# Patient Record
Sex: FEMALE | Race: WHITE | URBAN - METROPOLITAN AREA
[De-identification: names, ages, dates, MRNs, and addresses within clinical notes are randomized per-mention and may not be internally consistent; named-entity substitution may affect disease eponyms.]

---

## 2018-05-10 ENCOUNTER — VIRTUAL VISIT (OUTPATIENT)
Dept: FAMILY MEDICINE | Facility: OTHER | Age: 23
End: 2018-05-10

## 2018-05-10 NOTE — PROGRESS NOTES
"Date:   Clinician: Bee Mcmillan  Clinician NPI: [id number]  Patient: Lena Shen  Patient : 1995  Patient Address: [address], [place] [address]  Patient Phone: [phone]  Visit Protocol: URI  Patient Summary:  Lena is a 22 year old ( : 1995 ) female who initiated a Visit for cold, sinus infection, or influenza. When asked the question \"Please sign me up to receive news, health information and promotions. \", Lena responded \"No\".    Lena states her symptoms started suddenly 3-6 days ago.   Her symptoms consist of chills, malaise, myalgia, a sore throat, and enlarged lymph nodes. Lena also feels feverish.   Symptom details     Sore throat: Lena reports having moderate throat pain (between 4-6 on a 10 point pain scale), has exudate on her tonsils, and can swallow liquids. The lymph nodes in her neck are enlarged. A rash has not appeared on the skin since the sore throat started.     Temperature: Her current temperature is 99.8 degrees Fahrenheit.      Lena denies having wheezing, facial pain or pressure, cough, nasal congestion, dyspnea, ear pain, headache, rhinitis, and teeth pain. She also denies having recent facial or sinus surgery in the past 60 days, taking antibiotic medication for the symptoms, and double sickening (worsening symptoms after initial improvement).   Within the past week, Lena has been exposed to someone with strep throat. She has not recently been exposed to someone with influenza. Lena has not been in close contact with any high risk individuals.   Weight: 200 lbs   Lena does not smoke or use smokeless tobacco.   She denies pregnancy and denies breastfeeding. She has menstruated in the past month.  MEDICATIONS:  No current medications  , ALLERGIES:   NKDA    Clinician Response:  Dear [facility] Strep Test    I am sorry you are not feeling well. Your rapid strep test has . Based on the " information provided, it is possible that you could have strep throat. When left untreated, strep can spread to other areas of the body and cause a more serious infection.  A rapid strep test is the only way to determine if a bacterial infection or a virus is causing your sore throat. This is done in a lab where a swab of the back of your throat is collected tested for the bacteria that causes strep.  Since you chose not to use the lab order, please be seen:     In a clinic or urgent care    Within 24 hours     Call 911 or go to the emergency room if you suddenly develop a rash, are drooling or unable to swallow fluids, if you are having difficulty breathing, or feel that your throat is closing off.   Diagnosis: [facility] Strep  Diagnosis ICD: J02.0  [facility] Results: [place] Strep Test -   [facility] Secondary Results: null